# Patient Record
Sex: MALE | Race: BLACK OR AFRICAN AMERICAN | NOT HISPANIC OR LATINO | URBAN - METROPOLITAN AREA
[De-identification: names, ages, dates, MRNs, and addresses within clinical notes are randomized per-mention and may not be internally consistent; named-entity substitution may affect disease eponyms.]

---

## 2023-10-12 ENCOUNTER — HOSPITAL ENCOUNTER (EMERGENCY)
Facility: HOSPITAL | Age: 33
Discharge: STILL ADMITTED OR ADMINISTRATIVE | End: 2023-10-13
Payer: COMMERCIAL

## 2023-10-12 ENCOUNTER — OFFICE VISIT (OUTPATIENT)
Dept: URGENT CARE | Facility: CLINIC | Age: 33
End: 2023-10-12

## 2023-10-12 VITALS
OXYGEN SATURATION: 96 % | RESPIRATION RATE: 15 BRPM | WEIGHT: 250 LBS | DIASTOLIC BLOOD PRESSURE: 95 MMHG | HEART RATE: 121 BPM | HEIGHT: 71 IN | SYSTOLIC BLOOD PRESSURE: 172 MMHG | BODY MASS INDEX: 35 KG/M2 | TEMPERATURE: 98.4 F

## 2023-10-12 DIAGNOSIS — E86.0 DEHYDRATION: ICD-10-CM

## 2023-10-12 DIAGNOSIS — J03.90 ACUTE TONSILLITIS, UNSPECIFIED ETIOLOGY: Primary | ICD-10-CM

## 2023-10-12 PROCEDURE — G0382 LEV 3 HOSP TYPE B ED VISIT: HCPCS | Performed by: NURSE PRACTITIONER

## 2023-10-12 NOTE — PROGRESS NOTES
[facility]        NAME: Kiana White is a 35 y.o. male  : 1990    MRN: [id number]  DATE: 2023  TIME: 4:27 PM    Assessment and Plan   Acute tonsillitis, unspecified etiology [J03.90]  1. Acute tonsillitis, unspecified etiology  Transfer to other facility      2. Dehydration  Transfer to other facility        --No throat culture obtained due to sensitive gag reflex, risk involved. --Concern for possible PTA, dehydration  --Aggreeable to proceed directly to ER for further evaluation and treatment. Deemed safe and stable for transport via private vehicle. Patient Instructions     --Please proceed directly to ER for further evaluation and treatment. Chief Complaint     Chief Complaint   Patient presents with    Sore Throat     Pt reports having a sore throat and is tachy- he reports having headache and body aches. History of Present Illness       Here with complaints of painful sore throat, difficulty swallowing, feeling weak x 3 days. Initial fever (103), but no since. No cough, nasal congestion, rhinorrhea, headache. Threw up once this morning. Dark urine, some dizziness. Denies excess thirst, however. No OTC meds. Has not been able to eat or drink anything for 3 days. Review of Systems   Review of Systems   Constitutional:  Negative for fever. HENT:  Positive for sore throat. Negative for rhinorrhea. Gastrointestinal:  Negative for vomiting. Neurological:  Negative for headaches. Current Medications     No current outpatient medications on file. Current Allergies     Allergies as of 10/12/2023    (No Known Allergies)            The following portions of the patient's history were reviewed and updated as appropriate: allergies, current medications, past family history, past medical history, past social history, past surgical history and problem list.     No past medical history on file.     No past surgical history on file.    No family history on file. Medications have been verified. Objective   BP (!) 172/95   Pulse (!) 121   Temp 98.4 °F (36.9 °C)   Resp 15   Ht 5' 11" (1.803 m)   Wt 113 kg (250 lb)   SpO2 96%   BMI 34.87 kg/m²   No LMP for male patient. Physical Exam     Physical Exam  HENT:      Mouth/Throat:      Pharynx: Oropharyngeal exudate and posterior oropharyngeal erythema present. Comments: Tonsils 3+, erythematous with scant exudate. Uvula appears midline. Unable to get full visualization, however, due to sensitive gag reflex. No drooling, overt muffled voice, trismus. Pulmonary:      Effort: Pulmonary effort is normal.   Musculoskeletal:      Cervical back: Tenderness present. Lymphadenopathy:      Cervical: Cervical adenopathy present. Neurological:      Mental Status: He is alert.    Psychiatric:         Mood and Affect: Mood normal.